# Patient Record
Sex: FEMALE | Race: WHITE
[De-identification: names, ages, dates, MRNs, and addresses within clinical notes are randomized per-mention and may not be internally consistent; named-entity substitution may affect disease eponyms.]

---

## 2016-10-19 VITALS — DIASTOLIC BLOOD PRESSURE: 89 MMHG | SYSTOLIC BLOOD PRESSURE: 138 MMHG

## 2019-04-23 ENCOUNTER — HOSPITAL ENCOUNTER (EMERGENCY)
Dept: HOSPITAL 61 - ER | Age: 29
Discharge: LEFT BEFORE DISCHARGE | End: 2019-04-23
Payer: SELF-PAY

## 2019-04-23 DIAGNOSIS — R10.9: Primary | ICD-10-CM

## 2019-04-23 DIAGNOSIS — Z53.21: ICD-10-CM

## 2019-04-23 DIAGNOSIS — R11.10: ICD-10-CM

## 2021-03-09 ENCOUNTER — HOSPITAL ENCOUNTER (EMERGENCY)
Dept: HOSPITAL 61 - ER | Age: 31
Discharge: LEFT BEFORE DISCHARGE | End: 2021-03-09
Payer: SELF-PAY

## 2021-03-09 VITALS
DIASTOLIC BLOOD PRESSURE: 84 MMHG | SYSTOLIC BLOOD PRESSURE: 140 MMHG | SYSTOLIC BLOOD PRESSURE: 140 MMHG | DIASTOLIC BLOOD PRESSURE: 84 MMHG

## 2021-03-09 VITALS — WEIGHT: 136.03 LBS | BODY MASS INDEX: 22.66 KG/M2 | HEIGHT: 65 IN

## 2021-03-09 DIAGNOSIS — F32.9: ICD-10-CM

## 2021-03-09 DIAGNOSIS — Z53.21: ICD-10-CM

## 2021-03-09 DIAGNOSIS — Z88.0: ICD-10-CM

## 2021-03-09 DIAGNOSIS — R51.9: Primary | ICD-10-CM

## 2021-03-09 DIAGNOSIS — F41.9: Primary | ICD-10-CM

## 2021-03-09 DIAGNOSIS — Z87.891: ICD-10-CM

## 2021-03-09 PROCEDURE — 99281 EMR DPT VST MAYX REQ PHY/QHP: CPT

## 2021-03-09 NOTE — PHYS DOC
Past Medical History


Past Medical History:  Anxiety, Depression


Past Surgical History:  No Surgical History


Smoking Status:  Current Some Day Smoker


Alcohol Use:  Occasionally


Drug Use:  None





General Adult


EDM:


Chief Complaint:  ANXIETY/PANIC ATTACK





HPI:


HPI:





Patient is a 30-year-old female with history of anxiety, presented to ER for 

evaluation of anxiety and panic attack.  Patient has dealt with this problem 

since she was 16-year-old.  Patient has been in and out of rehab.  Patient wants

Ativan for her anxiety.  She has been on Ativan, her last dose was yesterday.  

Patient denies suicidal ideation denies homicidal ideation.  Patient denies any 

chest pain, no abdominal pain, no nausea vomiting.





Review of Systems:


Review of Systems:


Constitutional:   Denies fever or chills. []


Eyes:   Denies change in visual acuity. []


HENT:   Denies nasal congestion or sore throat. [] 


Respiratory:   Denies cough or shortness of breath. [] 


Cardiovascular:   Denies chest pain or edema. [] 


GI:   Denies abdominal pain, nausea, vomiting, bloody stools or diarrhea. [] 


:  Denies dysuria. [] 


Musculoskeletal:   Denies back pain or joint pain. [] 


Integument:   Denies rash. [] 


Neurologic:   Denies headache, focal weakness or sensory changes. [] 


Endocrine:   Denies polyuria or polydipsia. [] 


Lymphatic:  Denies swollen glands. [] 


Psychiatric:  Denies depression , positive for anxiety, denies suicidal ideation

 denies homicidal nation.





Heart Score:


C/O Chest Pain:  N/A


Risk Factors:


Risk Factors:  DM, Current or recent (<one month) smoker, HTN, HLP, family 

history of CAD, obesity.


Risk Scores:


Score 0 - 3:  2.5% MACE over next 6 weeks - Discharge Home


Score 4 - 6:  20.3% MACE over next 6 weeks - Admit for Clinical Observation


Score 7 - 10:  72.7% MACE over next 6 weeks - Early Invasive Strategies





Allergies:


Allergies:





Allergies








Coded Allergies Type Severity Reaction Last Updated Verified


 


  Penicillins Allergy Intermediate rash 10/19/16 Yes











Physical Exam:


PE:





Constitutional: Well developed, well nourished, no acute distress, non-toxic 

appearance. []


HENT: Normocephalic, atraumatic, bilateral external ears normal, oropharynx 

moist, no oral exudates, nose normal. []


Eyes: PERRLA, EOMI, conjunctiva normal, no discharge. [] 


Neck: Normal range of motion, no tenderness, supple, no stridor. [] 


Cardiovascular:Heart rate regular rhythm, no murmur []


Lungs & Thorax:  Bilateral breath sounds clear to auscultation []


Abdomen: Bowel sounds normal, soft, no tenderness, no masses, no pulsatile 

masses. [] 


Skin: Warm, dry, no erythema, no rash. [] 


Back: No tenderness, no CVA tenderness. [] 


Extremities: No tenderness, no cyanosis, no clubbing, ROM intact, no edema. [] 


Neurologic: Alert and oriented X 3, normal motor function, normal sensory 

function, no focal deficits noted. []


Psychologic: Affect normal, judgement normal, mood normal. []





Current Patient Data:


Vital Signs:





                                   Vital Signs








  Date Time  Temp Pulse Resp B/P (MAP) Pulse Ox O2 Delivery O2 Flow Rate FiO2


 


3/9/21 08:33 98.1 113 16 140/84 (102) 100 Room Air  





 98.1       











EKG:


EKG:


[]





Radiology/Procedures:


Radiology/Procedures:


[]





Course & Med Decision Making:


Course & Med Decision Making


Pertinent Labs and Imaging studies reviewed. (See chart for details)





Patient is a 30-year-old female with history of anxiety, presented to ER for 

evaluation of anxiety and panic attack.  Patient has dealt with this problem 

since she was 16-year-old.  Patient has been in and out of rehab.  Patient wants

 Ativan for her anxiety.  She has been on Ativan, her last dose was yesterday.  

Patient initially wanted help with drug rehab but then said if we are not giving

 her ativan or klonopin now she just leave.  This physician explained to her 

that we need to medically clear her first, then we will get one of our 

Psychiatric evaluator to see her then we will go from there.  Patient decided to

 sign out AMA.  Patient denies suicidal ideation, denies homicidal ideation.





Dragon Disclaimer:


Dragon Disclaimer:


This electronic medical record was generated, in whole or in part, using a voice

 recognition dictation system.





Departure


Departure


Impression:  


   Primary Impression:  


   Anxiety


Disposition:  07 AMA/ELOPED/LWBS


Condition:  STABLE


Referrals:  


NO PCP (PCP)


Patient Instructions:  Anxiety and Panic Attacks, Discharge Against Medical 

Advice











GISELLE WOOTEN DO                 Mar 9, 2021 08:52